# Patient Record
Sex: FEMALE | Race: WHITE
[De-identification: names, ages, dates, MRNs, and addresses within clinical notes are randomized per-mention and may not be internally consistent; named-entity substitution may affect disease eponyms.]

---

## 2017-05-23 ENCOUNTER — HOSPITAL ENCOUNTER (OUTPATIENT)
Dept: HOSPITAL 58 - RAD | Age: 27
Discharge: HOME | End: 2017-05-23
Attending: PHYSICIAN ASSISTANT

## 2017-05-23 VITALS — BODY MASS INDEX: 38 KG/M2

## 2017-05-23 DIAGNOSIS — M54.5: Primary | ICD-10-CM

## 2017-05-23 NOTE — DI
EXAM:  Lumbar spine five views 

  

HISTORY:  Low back pain 

  

COMPARISON:  None 

  

TECHNIQUE:  Five views lumbar spine were performed, including oblique views 

  

FINDINGS:  Vertebral bodies normal height.  No fracture.  No subluxation.  Sacroiliac joints intact.
  Sacral arcuate lines intact.  Spina bifida occulta suggested at S1.  Facet joints appear normal.  
Intervertebral disc spaces maintained. 

  

IMPRESSION:  No fracture or subluxation.

## 2017-08-05 ENCOUNTER — HOSPITAL ENCOUNTER (EMERGENCY)
Dept: HOSPITAL 58 - ED | Age: 27
Discharge: HOME | End: 2017-08-05

## 2017-08-05 VITALS — DIASTOLIC BLOOD PRESSURE: 91 MMHG | TEMPERATURE: 99.5 F | SYSTOLIC BLOOD PRESSURE: 141 MMHG

## 2017-08-05 VITALS — BODY MASS INDEX: 36.1 KG/M2

## 2017-08-05 DIAGNOSIS — R22.31: ICD-10-CM

## 2017-08-05 DIAGNOSIS — T63.461A: Primary | ICD-10-CM

## 2017-08-05 DIAGNOSIS — F17.210: ICD-10-CM

## 2017-08-05 PROCEDURE — 99281 EMR DPT VST MAYX REQ PHY/QHP: CPT

## 2017-08-05 PROCEDURE — 99282 EMERGENCY DEPT VISIT SF MDM: CPT

## 2017-08-05 NOTE — ED.PDOC
General


ED Provider: 


Dr. HAYDEN MIMS JR





Chief Complaint: Bite


Stated Complaint: WASP STING RIGHT FOREARM  INCREASING REDNESS AND SWELLING[End]

24 hours  99.5 88 20 97% 141/91 9/10 15 X18 CM ERYTHEMA NOTED ON INNER ASPECT 

OF RIGHT FOREARM red swollen[End]took benadryl


Time Seen by Physician: 14:29


Mode of Arrival: Walk-In


Information Source: Patient


Exam Limitations: No limitations


Primary Care Provider: 


FATOU MONTE





Nursing and Triage Documentation Reviewed and Agree: No





Review of Systems





- Review Of Systems


Constitutional: Reports: Malaise


Eyes: Reports: No symptoms


Ears, Nose, Mouth, Throat: Reports: No symptoms


Respiratory: Reports: No symptoms


Cardiac: Reports: No symptoms


GI: Reports: No symptoms


: Reports: No symptoms


Musculoskeletal: Reports: No symptoms


Skin: Reports: Change in color, Lesions (large erythematous area with central 

lesion consistent with history), Rash


Neurological: Reports: No symptoms


Endocrine: Reports: No symptoms


Hematologic/Lymphatic: Reports: No symptoms


All Other Systems: Other





Past Medical History





- Past Medical History


Previously Healthy: Yes


Endocrine: Reports: Unknown


Cardiovascular: Reports: Unknown


Respiratory: Reports: Unknown


Hematological: Reports: Unknown


Gastrointestinal: Reports: Unknown


Genitourinary: Reports: Unknown


Neuro/Psych: Reports: Unknown


Musculoskeletal: Reports: Unknown


Cancer: Reports: Unknown


Last Menstrual Period: N/A


Other Pertinent Past Medical History: rash with naproxen





- Surgical History


General Surgical History: Reports: Other (colon surgery ).  Denies: 

Hysterectomy (ablation)





- Family History


Family History: Reports: Unknown





- Social History


Smoking Status: Current every day smoker, Light tobacco smoker


Hx Substance Use: No


Alcohol Screening: None





- Immunizations


Tetanus Shot up to Date: No





Physical Exam





- Physical Exam


Appearance: Well-appearing, Obese


Pain Distress: Moderate


Skin: Warm, Dry


Neurological: Sensation intact, Motor intact, Reflexes intact, Cranial nerves 

intact, Alert, Oriented


Psychiatric: Affect appropriate





Critical Care Note





- Critical Care Note


Total Time (mins): 0





Course





- Course


Vital Signs: 


 











  Temp Pulse Resp BP Pulse Ox


 


 08/05/17 14:15  99.5 F  88  20  141/91 H  97














Departure





- Departure


Time of Disposition: 14:43


Disposition: HOME SELF-CARE


Discharge Problem: 


 Local reaction to insect sting





Instructions:  Insect Bite or Sting (ED)


Condition: Good


Pt referred to PMD for follow-up: Yes


Additional Instructions: 


antihistamine for swelling may try claritin(allegra, claritin and zyrtec are 

similar)


may be take n with classical antihistmaines(benadryl) which will cause 

drowsiness


may use benadry foru times a day if tolerated


ibuprofen or Aleve will help with pain and swelling


may use Norco for pain not controlled


return if swelling spreading or if fever over 101.0, sweats or chills(signs of 

infection)


may return to work if raeann well controlled


Prescriptions: 


Hydrocodone Bit/Acetaminophen [Norco 5-325] 1 - 2 tab PO Q6HR PRN #12 tablet


 PRN Reason: pain


Loratadine [Claritin] 10 mg PO DAILY PRN #30 tablet


 PRN Reason: itching or swelling


Allergies/Adverse Reactions: 


Allergies





naproxen Adverse Reaction (Verified 08/05/17 14:19)


 








Home Medications: 


Ambulatory Orders





Hydrocodone Bit/Acetaminophen [Norco 5-325] 1 - 2 tab PO Q6HR PRN #12 tablet 08/ 05/17 


Loratadine [Claritin] 10 mg PO DAILY PRN #30 tablet 08/05/17

## 2018-05-15 ENCOUNTER — HOSPITAL ENCOUNTER (EMERGENCY)
Dept: HOSPITAL 58 - ED | Age: 28
LOS: 1 days | Discharge: HOME | End: 2018-05-16

## 2018-05-15 VITALS — BODY MASS INDEX: 38.4 KG/M2

## 2018-05-15 DIAGNOSIS — R60.0: Primary | ICD-10-CM

## 2018-05-15 DIAGNOSIS — F17.210: ICD-10-CM

## 2018-05-15 DIAGNOSIS — M79.605: ICD-10-CM

## 2018-05-15 PROCEDURE — 36415 COLL VENOUS BLD VENIPUNCTURE: CPT

## 2018-05-15 PROCEDURE — 80053 COMPREHEN METABOLIC PANEL: CPT

## 2018-05-15 PROCEDURE — 99283 EMERGENCY DEPT VISIT LOW MDM: CPT

## 2018-05-15 PROCEDURE — 85025 COMPLETE CBC W/AUTO DIFF WBC: CPT

## 2018-05-16 VITALS — TEMPERATURE: 97.8 F | SYSTOLIC BLOOD PRESSURE: 141 MMHG | DIASTOLIC BLOOD PRESSURE: 89 MMHG

## 2018-05-16 NOTE — US
EXAM:  Left lower extremity venous doppler. 

  

HISTORY:  Left leg pain and swelling for 2 weeks. 

  

COMPARISON:  None available. 

  

TECHNIQUE:  Multiple grayscale and color doppler images were obtained. 

  

FINDINGS:  There is normal flow, compressibility and augmentation of flow within the left common femo
ral, greater saphenous, profunda, femoral, popliteal, posterior tibial, anterior tibial and peroneal 
veins. 

  

----------------- 

IMPRESSION: 

No evidence for left lower extremity deep vein thrombosis at the levels examined.

## 2018-05-16 NOTE — ED.PDOC
General


Stated Complaint: noted swelling of the left foot and leg for 3 weeks


Time Seen by Physician: 19:10


Mode of Arrival: Walk-In


Information Source: Patient


Exam Limitations: No limitations


Nursing and Triage Documentation Reviewed and Agree: Yes


Reviewed sepsis parameters & appropriate labs ordered?: Yes


System Inflammatory Response Syndrome: Not Applicable





<DIORPRESTON - Last Filed: 05/16/18 06:02>


System Inflammatory Response Syndrome: Not Applicable





<CODY BHAKTA - Last Filed: 05/16/18 08:14>


ED Provider: 


Dr. CODY BHAKTA





Chief Complaint: Extremity Swelling/Pain


Sepsis Protocol: 


For patient's 13 years and over:





Temp is 96.8 and below  and greater


Pulse >90 BPM


Resp >20/minute


Acutely Altered Mental Status





Are patient's symptoms suggestive of a new infection, such as:


   -Pneumonia


   -Skin, Soft Tissue


   -Endocarditis


   -UTI


   -Bone, Joint Infection


   -Implantable Device


   -Acute Abdominal Infection


   -Wound Infection


   -Meningitis


   -Blood Stream Catheter Infection


   -Unknown








Musculoskeletal Complaint Exam





- Lower Extremity Complaint/Exam


Location of Pain: Reports: Left, Leg


Mechanism of Injury: Reports: No known trauma


Onset/Duration: 3 weeks


Symptoms Are: Still present


Onset of Pain: Reports: Immediate


Initial Severity: Mild


Current Severity: Moderate


Location: Reports: Discrete


Character: Reports: Dull, Aching, Throbbing


Aggravating: Reports: Movement, Weight bearing


Able to Bear Weight: Yes


Associated Signs and Symptoms: Reports: Swelling


DVT Risk Factors: Reports: Smoking


Lower Extremity Findings: Present: Swelling, Tenderness


NV Bundle Intact Distal to Injury: Yes


Compartment Syndrome Risk Factors: Present: Pain


Differential Diagnoses: DVT





<MAYURDEANGELOPRESTON - Last Filed: 05/16/18 06:02>





Review of Systems





- Review Of Systems


Constitutional: Reports: No symptoms


Eyes: Reports: No symptoms


Ears, Nose, Mouth, Throat: Reports: No symptoms


Respiratory: Reports: No symptoms


Cardiac: Reports: Edema


GI: Reports: No symptoms


: Reports: No symptoms


Musculoskeletal: Reports: No symptoms


Skin: Reports: No symptoms


Neurological: Reports: No symptoms


Endocrine: Reports: No symptoms


Hematologic/Lymphatic: Reports: No symptoms


All Other Systems: Reviewed and Negative





<MAYURDEANGELOPRESTON - Last Filed: 05/16/18 06:02>





Past Medical History





- Past Medical History


Previously Healthy: Yes


Endocrine: Reports: Unknown


Cardiovascular: Reports: Unknown


Respiratory: Reports: Unknown


Hematological: Reports: Unknown


Gastrointestinal: Reports: Unknown


Genitourinary: Reports: Unknown


Neuro/Psych: Reports: Unknown


Musculoskeletal: Reports: Unknown


Cancer: Reports: Unknown


Last Menstrual Period: na


Other Pertinent Past Medical History: rash with naproxen





- Surgical History


General Surgical History: Reports: Other (colon surgery ).  Denies: 

Hysterectomy (ablation)





- Family History


Family History: Reports: Unknown





- Social History


Smoking Status: Current every day smoker, Light tobacco smoker


Hx Substance Use: No


Alcohol Screening: None





- Immunizations


Tetanus Shot up to Date: No





<PRESTON RADER - Last Filed: 05/16/18 06:02>





Physical Exam





- Physical Exam


Appearance: Well-appearing, No pain distress, Well-nourished


Eyes: YEMI, EOMI, Conjunctiva clear


ENT: Ears normal, Nose normal, Oropharynx normal


Neck: Supple


Respiratory: Airway patent


Cardiovascular: RRR


GI/: Soft, Nontender, No masses, Bowel sounds normal, No Organomegaly


Musculoskeletal: Edema


Skin: Warm


Neurological: Sensation intact


Psychiatric: Affect appropriate, Mood appropriate





<PRESTON RADER - Last Filed: 05/16/18 06:02>





Physician Notification





- Case Discussed


Physician Notified: dr bhakta


Time of Notification: 07:00





<PRESTON RADER - Last Filed: 05/16/18 06:02>





Critical Care Note





- Critical Care Note


Total Time (mins): 0





<CODY BHAKTA - Last Filed: 05/16/18 08:14>





- Course


Orders, Labs, Meds: 





Orders











 Category Date Time Status


 


 Hydrocodone Bit/Acetaminophen [Norco 5-325] MEDS  05/16/18 00:46 Discontinued





 1 tab PO ONCE STA   


 


 ULTRASOUND VENOUS SCAN LT. LEG [U/S VENOUS SCAN LT. LEG RADS  05/16/18 06:54 

Completed





 ] Stat   








Medications














Discontinued Medications














Generic Name Dose Route Start Last Admin





  Trade Name Freq  PRN Reason Stop Dose Admin


 


Hydrocodone Bitart/Acetaminophen  1 tab  05/16/18 00:46  05/16/18 00:53





  [place] 5-325  PO  05/16/18 00:47  1 tab





  ONCE STA   Administration





     





     





     





     











Vital Signs: 





 











  Temp Pulse Resp BP Pulse Ox


 


 05/16/18 00:40  97.8 F  82  20  141/89 H  97


 


 05/15/18 19:07  99.1 F  95 H  18  133/84  96














Departure





<MAYUR-ER,PRESTON - Last Filed: 05/16/18 06:02>





- Departure


Time of Disposition: 08:13 (seen with frances report given to the pt )


Pt referred to PMD for follow-up: Yes


IPMP verified?: No





<CODY BHAKTA - Last Filed: 05/16/18 08:14>





- Departure


Disposition: HOME SELF-CARE


Discharge Problem: 


 Edema of lower extremity





Condition: Good


Allergies/Adverse Reactions: 


Allergies





naproxen Adverse Reaction (Verified 05/15/18 19:24)


 Itching








Home Medications: 


Ambulatory Orders





1 [No Reported Medications]  05/15/18

## 2019-02-26 ENCOUNTER — HOSPITAL ENCOUNTER (EMERGENCY)
Dept: HOSPITAL 58 - ED | Age: 29
Discharge: HOME | End: 2019-02-26

## 2019-02-26 VITALS — DIASTOLIC BLOOD PRESSURE: 95 MMHG | TEMPERATURE: 98.2 F | SYSTOLIC BLOOD PRESSURE: 158 MMHG

## 2019-02-26 VITALS — BODY MASS INDEX: 42 KG/M2

## 2019-02-26 DIAGNOSIS — K08.89: Primary | ICD-10-CM

## 2019-02-26 PROCEDURE — 96372 THER/PROPH/DIAG INJ SC/IM: CPT

## 2019-02-26 PROCEDURE — 99282 EMERGENCY DEPT VISIT SF MDM: CPT

## 2019-02-26 NOTE — ED.PDOC
General


ED Provider: 


Dr. PRESTON EASON MD





Chief Complaint: Tooth Problem


Stated Complaint: toothache


Time Seen by Physician: 20:37


Mode of Arrival: Walk-In


Information Source: Patient


Exam Limitations: No limitations


Primary Care Provider: 


MILLER STOVER





Nursing and Triage Documentation Reviewed and Agree: Yes


Does patient meet sepsis criteria?: No


If yes, has appropriate treatment been initiated?: Yes


System Inflammatory Response Syndrome: Not Applicable


Sepsis Protocol: 


For patient's 13 years and over:





Temp is 96.8 and below  and greater


Pulse >90 BPM


Resp >20/minute


Acutely Altered Mental Status





Are patient's symptoms suggestive of a new infection, such as:


   -Pneumonia


   -Skin, Soft Tissue


   -Endocarditis


   -UTI


   -Bone, Joint Infection


   -Implantable Device


   -Acute Abdominal Infection


   -Wound Infection


   -Meningitis


   -Blood Stream Catheter Infection


   -Unknown








Review of Systems





- Review Of Systems


Constitutional: Reports: No symptoms


Eyes: Reports: No symptoms


Ears, Nose, Mouth, Throat: Reports: No symptoms


Respiratory: Reports: No symptoms


Cardiac: Reports: No symptoms


GI: Reports: No symptoms


: Reports: No symptoms


Musculoskeletal: Reports: No symptoms


Skin: Reports: No symptoms


Neurological: Reports: No symptoms


Endocrine: Reports: No symptoms


Hematologic/Lymphatic: Reports: No symptoms


All Other Systems: Reviewed and Negative





Past Medical History





- Past Medical History


Previously Healthy: Yes


Endocrine: Reports: Unknown


Cardiovascular: Reports: Unknown


Respiratory: Reports: Unknown


Hematological: Reports: Unknown


Gastrointestinal: Reports: Unknown


Genitourinary: Reports: Unknown


Neuro/Psych: Reports: Unknown


Musculoskeletal: Reports: Unknown


Cancer: Reports: Unknown


Last Menstrual Period: n/a


Other Pertinent Past Medical History: rash with naproxen





- Surgical History


General Surgical History: Reports: Other (colon surgery ).  Denies: 

Hysterectomy (ablation)





- Family History


Family History: Reports: Unknown





- Social History


Smoking Status: Former smoker


Hx Substance Use: No


Alcohol Screening: None





Physical Exam





- Physical Exam


Appearance: Well-appearing, No pain distress, Well-nourished, Obese


Ill-appearing: None


Pain Distress: Mild


Eyes: YEMI


ENT: Ears normal, Nose normal, Oropharynx normal (right upper molar tender)


Neck: Supple


Respiratory: Airway patent, Breath sounds clear, Breath sounds equal, 

Respirations nonlabored


Cardiovascular: RRR, Pulses normal, No rub, No murmur


GI/: Soft, Nontender, No masses, Bowel sounds normal, No Organomegaly


Musculoskeletal: Normal strength, ROM intact, No edema, No calf tenderness


Skin: Warm, Dry, Normal color


Neurological: Sensation intact, Motor intact, Reflexes intact, Cranial nerves 

intact, Alert, Oriented


Psychiatric: Anxious





Critical Care Note





- Critical Care Note


Total Time (mins): 0





Course





- Course


Vital Signs: 





 











  Temp Pulse Resp BP Pulse Ox


 


 02/26/19 20:07  98.2 F  77  20  158/95 H  98














Departure





- Departure


Time of Disposition: 21:00


Disposition: HOME SELF-CARE


Discharge Problem: 


 Toothache





Instructions:  Toothache (ED)


Condition: Good


Pt referred to PMD for follow-up: Yes


IPMP verified?: No


Prescriptions: 


Amoxicillin 500 mg PO TID 7 Days #21 tablet NS


Allergies/Adverse Reactions: 


Allergies





naproxen Adverse Reaction (Verified 02/26/19 20:10)


 Itching








Home Medications: 


Ambulatory Orders





Amoxicillin 500 mg PO TID 7 Days #21 tablet NS 02/26/19 








Transfer Form Completed: No


Disposition Discussed With: Patient

## 2019-04-08 ENCOUNTER — HOSPITAL ENCOUNTER (EMERGENCY)
Dept: HOSPITAL 58 - ED | Age: 29
Discharge: HOME | End: 2019-04-08

## 2019-04-08 VITALS — BODY MASS INDEX: 41.8 KG/M2

## 2019-04-08 VITALS — DIASTOLIC BLOOD PRESSURE: 83 MMHG | TEMPERATURE: 99.1 F | SYSTOLIC BLOOD PRESSURE: 141 MMHG

## 2019-04-08 DIAGNOSIS — R10.9: ICD-10-CM

## 2019-04-08 DIAGNOSIS — M54.5: Primary | ICD-10-CM

## 2019-04-08 DIAGNOSIS — R50.9: ICD-10-CM

## 2019-04-08 PROCEDURE — 99283 EMERGENCY DEPT VISIT LOW MDM: CPT

## 2019-04-08 PROCEDURE — 84703 CHORIONIC GONADOTROPIN ASSAY: CPT

## 2019-04-08 PROCEDURE — 36415 COLL VENOUS BLD VENIPUNCTURE: CPT

## 2019-04-08 PROCEDURE — 96372 THER/PROPH/DIAG INJ SC/IM: CPT

## 2019-04-08 NOTE — ED.PDOC
General


ED Provider: 


Dr. CODY JACOB





Chief Complaint: Back Pain


Stated Complaint: back pain lumbar left sided


Time Seen by Physician: 13:00


Mode of Arrival: Walk-In


Information Source: Patient


Exam Limitations: No limitations


Primary Care Provider: 


MILLER STOVER





Nursing and Triage Documentation Reviewed and Agree: Yes


Does patient meet sepsis criteria?: No


If yes, has appropriate treatment been initiated?: No


System Inflammatory Response Syndrome: Not Applicable


Sepsis Protocol: 


For patient's 13 years and over:





Temp is 96.8 and below  and greater


Pulse >90 BPM


Resp >20/minute


Acutely Altered Mental Status





Are patient's symptoms suggestive of a new infection, such as:


   -Pneumonia


   -Skin, Soft Tissue


   -Endocarditis


   -UTI


   -Bone, Joint Infection


   -Implantable Device


   -Acute Abdominal Infection


   -Wound Infection


   -Meningitis


   -Blood Stream Catheter Infection


   -Unknown








Musculoskeletal Complaint Exam





- Back Pain Complaint/Exam


Mechanism of Injury: Reports: No known trauma


Onset/Duration: hisory of disc issues pain x1 day


Symptoms Are: Still present


Timing: Constant


Episodes Lasting: Hours


Initial Severity: Moderate


Current Severity: Moderate


Location: Reports: Discrete


Character: Reports: Aching, Throbbing, Spasmodic, Stiffness


Aggravating: Reports: Movements, Lifting, Bending, Walking


Alleviating: Reports: Rest, Position


Associated Signs and Symptoms: Reports: Flank pain (left ).  Denies: Swelling, 

Redness, Bruising, Fever, Weakness, Numbness, Tingling, Abdominal pain, Bladder 

incontinence, Bowel incontinence, Weight loss, Pain with weight bearing


Related History: Reports: Similar episode


TAD Risk Factors: Reports: None


AAA Risk Factors: Reports: None


Cauda Equina Risk Factors: Reports: None


Epidural Abcess Risk Factors: Reports: None


Related Surgical History: Reports: None


Focal Tenderness: No


Paraspinal Muscle Tenderness: No


Paraspinal Muscle Spasm: Yes


Scoliosis: No


Lordosis: No


SLR Test: Right Positive, Right Negative, Left Positive


Hip Motion Testing Pain: Right Negative, Left Positive


Focal Weakness: Present: None


Focal Sensory Loss: Present: None


Gait: Present: Unable


Differential Diagnoses: Strain, Sprain





Review of Systems





- Review Of Systems


Constitutional: Reports: No symptoms


Eyes: Reports: No symptoms


Ears, Nose, Mouth, Throat: Reports: No symptoms


Respiratory: Reports: No symptoms


Cardiac: Reports: No symptoms


GI: Reports: No symptoms


: Reports: No symptoms


Musculoskeletal: Reports: Back pain


Skin: Reports: No symptoms


Neurological: Reports: No symptoms


Endocrine: Reports: No symptoms


Hematologic/Lymphatic: Reports: No symptoms


All Other Systems: Reviewed and Negative





Past Medical History





- Past Medical History


Previously Healthy: Yes


Endocrine: Reports: Unknown


Cardiovascular: Reports: Unknown


Respiratory: Reports: Unknown


Hematological: Reports: Unknown


Gastrointestinal: Reports: Unknown


Genitourinary: Reports: Unknown


Neuro/Psych: Reports: Unknown


Musculoskeletal: Reports: Unknown


Cancer: Reports: Unknown


Last Menstrual Period: several years ago


Other Pertinent Past Medical History: rash with naproxen





- Surgical History


General Surgical History: Reports: Other (colon surgery ).  Denies: 

Hysterectomy (ablation)





- Family History


Family History: Reports: Unknown





- Social History


Smoking Status: Smoker current status unknown


Hx Substance Use: No


Alcohol Screening: None





- Immunizations


Tetanus Shot up to Date: No





Physical Exam





- Physical Exam


Appearance: Well-appearing, No pain distress, Well-nourished


Eyes: YEMI, EOMI, Conjunctiva clear


ENT: Ears normal, Nose normal, Oropharynx normal


Respiratory: Airway patent, Breath sounds clear, Breath sounds equal, 

Respirations nonlabored


Cardiovascular: RRR, Pulses normal, No rub, No murmur


GI/: Soft, Nontender, No masses, Bowel sounds normal, No Organomegaly


Musculoskeletal: Limited ROM (lumbar)


Skin: Warm, Dry, Normal color


Neurological: Sensation intact, Motor intact, Reflexes intact, Cranial nerves 

intact, Alert, Oriented


Psychiatric: Affect appropriate, Mood appropriate





Interpretation





- Radiology Interpretation


Radiology Interpretation By: Radiologist


Radiology Results: No acute changes





Critical Care Note





- Critical Care Note


Total Time (mins): 0





Course





- Course


Orders, Labs, Meds: 





Lab Review











  04/08/19





  13:30


 


Serum Pregnancy, Qual  Negative








Orders











 Category Date Time Status


 


 SERUM PREGNANCY Stat LAB  04/08/19 13:30 Completed


 


 Ketorolac Tromethamine [Toradol] MEDS  04/08/19 13:21 Discontinued





 60 mg IM ONCE STA   


 


 CT LUMBAR SPINE W/O CONTRAST Stat RADS  04/08/19 13:19 Completed








Medications














Discontinued Medications














Generic Name Dose Route Start Last Admin





  Trade Name Freq  PRN Reason Stop Dose Admin


 


Ketorolac Tromethamine  60 mg  04/08/19 13:21  04/08/19 13:28





  Toradol  IM  04/08/19 13:22  60 mg





  ONCE STA   Administration





     





     





     





     











Vital Signs: 





 











  Temp Pulse Resp BP Pulse Ox


 


 04/08/19 12:52  99.1 F  102 H  16  141/83 H  97














Departure





- Departure


Time of Disposition: 14:40


Disposition: HOME SELF-CARE


Discharge Problem: 


 Lumbar back pain





Instructions:  Low Back Strain (ED), Acute Low Back Pain (ED)


Condition: Good


Pt referred to PMD for follow-up: Yes


IPMP verified?: No


Additional Instructions: 


Please call your Family Physician as soon as possible to schedule a follow-up 

appointment.


Allergies/Adverse Reactions: 


Allergies





naproxen Adverse Reaction (Verified 02/26/19 20:10)


 Itching








Home Medications: 


Ambulatory Orders





1 [No Reported Medications]  04/08/19 








Disposition Discussed With: Patient

## 2019-04-08 NOTE — CT
EXAM:  CT of the lumbar spine without contrast 

  

History:  Lower back pain with left leg numbness and swelling. 

  

Technique:  Multiplanar CT images through the lumbar spine were obtained without the administration o
f IV contrast 

  

Findings: 

  

No acute fracture or subluxation of the lumbar spine.  Disc space heights are preserved. 

  

T12-L1: No significant bony central canal stenosis or bony neural foraminal narrowing. 

  

L1-L2:  No significant bony central canal stenosis or bony neural foraminal narrowing. 

  

L2-L3:  No significant bony central canal stenosis or bony neural foraminal narrowing. 

  

L3-L4:  No significant bony central canal stenosis or bony neural foraminal narrowing. 

  

L4-L5:  Small disc bulge effacing anterior thecal sac with no significant bony central canal stenosis
.  No significant bony neural foraminal narrowing. 

  

L5-L1:  No significant bony central canal stenosis and no significant bony neural foraminal narrowing
. 

  

Impression: 

1.  No acute osseous abnormality of the lumbar spine. 

2.  No significant degenerative changes.

## 2019-04-23 ENCOUNTER — HOSPITAL ENCOUNTER (OUTPATIENT)
Dept: HOSPITAL 58 - RAD | Age: 29
Discharge: HOME | End: 2019-04-23
Attending: NURSE PRACTITIONER

## 2019-04-23 VITALS — BODY MASS INDEX: 41.8 KG/M2

## 2019-04-23 DIAGNOSIS — R01.1: ICD-10-CM

## 2019-04-23 DIAGNOSIS — R60.9: Primary | ICD-10-CM

## 2019-04-23 NOTE — DI
EXAM:  Chest two views 

  

HISTORY:  Edema 

  

COMPARISON:  None 

  

TECHNIQUE:  Two views of the chest were performed 

  

FINDINGS:  The lungs are clear.  There is no pleural effusion or pneumothorax.  The heart is normal i
n size.  The mediastinal contour is normal.  There are no acute abnormalities of the bones. 

  

IMPRESSION:  No acute cardiopulmonary process.

## 2019-08-21 ENCOUNTER — HOSPITAL ENCOUNTER (OUTPATIENT)
Dept: HOSPITAL 58 - RAD | Age: 29
Discharge: HOME | End: 2019-08-21
Attending: NURSE PRACTITIONER

## 2019-08-21 VITALS — BODY MASS INDEX: 41.8 KG/M2

## 2019-08-21 DIAGNOSIS — M79.672: Primary | ICD-10-CM

## 2019-08-21 DIAGNOSIS — M25.476: ICD-10-CM

## 2019-08-21 NOTE — CT
EXAM:  CT left foot without contrast 

  

HISTORY:  Pain, swelling 

  

COMPARISON:  None 

  

TECHNIQUE:  CT left foot performed without intravenous contrast.  Coronal and sagittal reformatted im
ages obtained. 

  

FINDINGS:  Bone mineralization is normal.  Joint spaces maintained.  No fracture or dislocation.  Rad
iopaque marker placed about the dorsal aspect of the forefoot.  No mass or fluid collection identifie
d. Mild subcutaneous edema about the foot. 

  

IMPRESSION: 

1.  No fracture or dislocation. 

2.  Mild subcutaneous edema about the foot. No focal drainable collection.